# Patient Record
Sex: FEMALE | Race: WHITE | NOT HISPANIC OR LATINO
[De-identification: names, ages, dates, MRNs, and addresses within clinical notes are randomized per-mention and may not be internally consistent; named-entity substitution may affect disease eponyms.]

---

## 2017-01-12 ENCOUNTER — RECORD ABSTRACTING (OUTPATIENT)
Age: 22
End: 2017-01-12

## 2017-04-03 ENCOUNTER — CLINICAL ADVICE (OUTPATIENT)
Age: 22
End: 2017-04-03

## 2017-08-28 ENCOUNTER — CLINICAL ADVICE (OUTPATIENT)
Age: 22
End: 2017-08-28

## 2018-02-20 ENCOUNTER — TRANSCRIPTION ENCOUNTER (OUTPATIENT)
Age: 23
End: 2018-02-20

## 2018-10-09 ENCOUNTER — APPOINTMENT (OUTPATIENT)
Dept: ORTHOPEDIC SURGERY | Facility: CLINIC | Age: 23
End: 2018-10-09
Payer: COMMERCIAL

## 2018-10-09 VITALS
WEIGHT: 140 LBS | SYSTOLIC BLOOD PRESSURE: 105 MMHG | BODY MASS INDEX: 22.5 KG/M2 | HEIGHT: 66 IN | HEART RATE: 55 BPM | DIASTOLIC BLOOD PRESSURE: 72 MMHG

## 2018-10-09 DIAGNOSIS — M21.70 UNEQUAL LIMB LENGTH (ACQUIRED), UNSPECIFIED SITE: ICD-10-CM

## 2018-10-09 DIAGNOSIS — M53.3 SACROCOCCYGEAL DISORDERS, NOT ELSEWHERE CLASSIFIED: ICD-10-CM

## 2018-10-09 PROCEDURE — 99204 OFFICE O/P NEW MOD 45 MIN: CPT

## 2020-01-16 ENCOUNTER — TRANSCRIPTION ENCOUNTER (OUTPATIENT)
Age: 25
End: 2020-01-16

## 2020-07-08 ENCOUNTER — TRANSCRIPTION ENCOUNTER (OUTPATIENT)
Age: 25
End: 2020-07-08

## 2020-07-09 ENCOUNTER — TRANSCRIPTION ENCOUNTER (OUTPATIENT)
Age: 25
End: 2020-07-09

## 2020-07-14 ENCOUNTER — TRANSCRIPTION ENCOUNTER (OUTPATIENT)
Age: 25
End: 2020-07-14

## 2020-08-28 ENCOUNTER — TRANSCRIPTION ENCOUNTER (OUTPATIENT)
Age: 25
End: 2020-08-28

## 2020-09-03 ENCOUNTER — RESULT REVIEW (OUTPATIENT)
Age: 25
End: 2020-09-03

## 2020-11-02 ENCOUNTER — APPOINTMENT (OUTPATIENT)
Dept: INTERNAL MEDICINE | Facility: CLINIC | Age: 25
End: 2020-11-02
Payer: COMMERCIAL

## 2020-11-02 ENCOUNTER — TRANSCRIPTION ENCOUNTER (OUTPATIENT)
Age: 25
End: 2020-11-02

## 2020-11-02 VITALS
BODY MASS INDEX: 21.69 KG/M2 | WEIGHT: 135 LBS | RESPIRATION RATE: 18 BRPM | SYSTOLIC BLOOD PRESSURE: 96 MMHG | HEIGHT: 66 IN | TEMPERATURE: 98.2 F | DIASTOLIC BLOOD PRESSURE: 58 MMHG | HEART RATE: 66 BPM | OXYGEN SATURATION: 100 %

## 2020-11-02 DIAGNOSIS — Z00.00 ENCOUNTER FOR GENERAL ADULT MEDICAL EXAMINATION W/OUT ABNORMAL FINDINGS: ICD-10-CM

## 2020-11-02 DIAGNOSIS — F41.9 ANXIETY DISORDER, UNSPECIFIED: ICD-10-CM

## 2020-11-02 PROCEDURE — 99072 ADDL SUPL MATRL&STAF TM PHE: CPT

## 2020-11-02 PROCEDURE — G0444 DEPRESSION SCREEN ANNUAL: CPT | Mod: NC,59

## 2020-11-02 PROCEDURE — G0442 ANNUAL ALCOHOL SCREEN 15 MIN: CPT | Mod: NC

## 2020-11-02 PROCEDURE — 99385 PREV VISIT NEW AGE 18-39: CPT | Mod: 25

## 2020-11-02 RX ORDER — MINOCYCLINE HYDROCHLORIDE 65 MG/1
65 TABLET, FILM COATED, EXTENDED RELEASE ORAL
Refills: 0 | Status: ACTIVE | COMMUNITY

## 2020-11-02 RX ORDER — ESCITALOPRAM OXALATE 20 MG/1
20 TABLET ORAL
Refills: 0 | Status: DISCONTINUED | COMMUNITY
End: 2020-11-02

## 2020-11-02 RX ORDER — LEVONORGESTREL AND ETHINYL ESTRADIOL 0.15-0.03
KIT ORAL
Refills: 0 | Status: ACTIVE | COMMUNITY

## 2020-11-02 NOTE — HISTORY OF PRESENT ILLNESS
[FreeTextEntry1] : New patient  [de-identified] : Feels like room goes sideways when sleeping or lying down only at night\par -feels like she's sliding\par -doesn’t happen in daytime\par -3 mos hx\par \par never had before but had presyncope growing up \par \par Graduated college [employer] for [employer]\par

## 2020-11-02 NOTE — HEALTH RISK ASSESSMENT
[Very Good] : ~his/her~ current health as very good [Good] : ~his/her~  mood as  good [Yes] : Yes [2 - 4 times a month (2 pts)] : 2-4 times a month (2 points) [1 or 2 (0 pts)] : 1 or 2 (0 points) [Never (0 pts)] : Never (0 points) [No] : In the past 12 months have you used drugs other than those required for medical reasons? No [0] : 2) Feeling down, depressed, or hopeless: Not at all (0) [Patient reported PAP Smear was normal] : Patient reported PAP Smear was normal [HIV Test offered] : HIV Test offered [Hepatitis C test offered] : Hepatitis C test offered [None] : None [With Family] : lives with family [# of Members in Household ___] :  household currently consist of [unfilled] member(s) [College] : College [Single] : single [Sexually Active] : sexually active [Feels Safe at Home] : Feels safe at home [Fully functional (bathing, dressing, toileting, transferring, walking, feeding)] : Fully functional (bathing, dressing, toileting, transferring, walking, feeding) [Fully functional (using the telephone, shopping, preparing meals, housekeeping, doing laundry, using] : Fully functional and needs no help or supervision to perform IADLs (using the telephone, shopping, preparing meals, housekeeping, doing laundry, using transportation, managing medications and managing finances) [Smoke Detector] : smoke detector [Carbon Monoxide Detector] : carbon monoxide detector [FreeTextEntry1] : see above [] : No [de-identified] : regular runs an hour / day  [de-identified] : had anorrhexia but recovered since 23 years old [[id number]] : 0 [Change in mental status noted] : No change in mental status noted [Language] : denies difficulty with language [Behavior] : denies difficulty with behavior [Learning/Retaining New Information] : denies difficulty learning/retaining new information [Handling Complex Tasks] : denies difficulty handling complex tasks [Reasoning] : denies difficulty with reasoning [Spatial Ability and Orientation] : denies difficulty with spatial ability and orientation [High Risk Behavior] : no high risk behavior [Reports changes in hearing] : Reports no changes in hearing [Reports changes in vision] : Reports no changes in vision [Reports changes in dental health] : Reports no changes in dental health [PapSmearDate] : 8/2020 [PapSmearComments] : HPV  [de-identified] : mom and brothers [de-identified] : recent grad looking for work [FreeTextEntry2] : looking at buying / merchandising [de-identified] : some hearing loss stable since birth

## 2021-08-19 ENCOUNTER — RESULT REVIEW (OUTPATIENT)
Age: 26
End: 2021-08-19

## 2022-05-31 ENCOUNTER — RESULT REVIEW (OUTPATIENT)
Age: 27
End: 2022-05-31

## 2023-03-16 ENCOUNTER — EMERGENCY (EMERGENCY)
Facility: HOSPITAL | Age: 28
LOS: 1 days | Discharge: HOME | End: 2023-03-16
Attending: EMERGENCY MEDICINE | Admitting: EMERGENCY MEDICINE
Payer: COMMERCIAL

## 2023-03-16 VITALS
SYSTOLIC BLOOD PRESSURE: 96 MMHG | RESPIRATION RATE: 17 BRPM | TEMPERATURE: 98 F | WEIGHT: 134.92 LBS | HEART RATE: 95 BPM | OXYGEN SATURATION: 98 % | DIASTOLIC BLOOD PRESSURE: 61 MMHG

## 2023-03-16 VITALS
RESPIRATION RATE: 18 BRPM | DIASTOLIC BLOOD PRESSURE: 58 MMHG | TEMPERATURE: 101 F | SYSTOLIC BLOOD PRESSURE: 98 MMHG | HEART RATE: 96 BPM | OXYGEN SATURATION: 100 %

## 2023-03-16 LAB
ALBUMIN SERPL ELPH-MCNC: 3.9 G/DL — SIGNIFICANT CHANGE UP (ref 3.3–5)
ALP SERPL-CCNC: 42 U/L — SIGNIFICANT CHANGE UP (ref 40–120)
ALT FLD-CCNC: 36 U/L — SIGNIFICANT CHANGE UP (ref 10–45)
ANION GAP SERPL CALC-SCNC: 7 MMOL/L — SIGNIFICANT CHANGE UP (ref 5–17)
APPEARANCE UR: CLEAR — SIGNIFICANT CHANGE UP
AST SERPL-CCNC: 27 U/L — SIGNIFICANT CHANGE UP (ref 10–40)
BACTERIA # UR AUTO: ABNORMAL /HPF
BASOPHILS # BLD AUTO: 0.02 K/UL — SIGNIFICANT CHANGE UP (ref 0–0.2)
BASOPHILS NFR BLD AUTO: 0.3 % — SIGNIFICANT CHANGE UP (ref 0–2)
BILIRUB SERPL-MCNC: 0.3 MG/DL — SIGNIFICANT CHANGE UP (ref 0.2–1.2)
BILIRUB UR-MCNC: NEGATIVE — SIGNIFICANT CHANGE UP
BUN SERPL-MCNC: 9 MG/DL — SIGNIFICANT CHANGE UP (ref 7–23)
CALCIUM SERPL-MCNC: 8.3 MG/DL — LOW (ref 8.4–10.5)
CHLORIDE SERPL-SCNC: 103 MMOL/L — SIGNIFICANT CHANGE UP (ref 96–108)
CO2 SERPL-SCNC: 27 MMOL/L — SIGNIFICANT CHANGE UP (ref 22–31)
COLOR SPEC: YELLOW — SIGNIFICANT CHANGE UP
COMMENT - URINE: SIGNIFICANT CHANGE UP
CREAT SERPL-MCNC: 0.66 MG/DL — SIGNIFICANT CHANGE UP (ref 0.5–1.3)
DIFF PNL FLD: NEGATIVE — SIGNIFICANT CHANGE UP
EGFR: 124 ML/MIN/1.73M2 — SIGNIFICANT CHANGE UP
EOSINOPHIL # BLD AUTO: 0 K/UL — SIGNIFICANT CHANGE UP (ref 0–0.5)
EOSINOPHIL NFR BLD AUTO: 0 % — SIGNIFICANT CHANGE UP (ref 0–6)
EPI CELLS # UR: ABNORMAL
FLUAV AG NPH QL: SIGNIFICANT CHANGE UP
FLUBV AG NPH QL: SIGNIFICANT CHANGE UP
GLUCOSE SERPL-MCNC: 90 MG/DL — SIGNIFICANT CHANGE UP (ref 70–99)
GLUCOSE UR QL: NEGATIVE — SIGNIFICANT CHANGE UP
HCT VFR BLD CALC: 42 % — SIGNIFICANT CHANGE UP (ref 34.5–45)
HGB BLD-MCNC: 13.8 G/DL — SIGNIFICANT CHANGE UP (ref 11.5–15.5)
IMM GRANULOCYTES NFR BLD AUTO: 0.3 % — SIGNIFICANT CHANGE UP (ref 0–0.9)
KETONES UR-MCNC: ABNORMAL
LEUKOCYTE ESTERASE UR-ACNC: ABNORMAL
LIDOCAIN IGE QN: 99 U/L — SIGNIFICANT CHANGE UP (ref 73–393)
LYMPHOCYTES # BLD AUTO: 0.17 K/UL — LOW (ref 1–3.3)
LYMPHOCYTES # BLD AUTO: 2.2 % — LOW (ref 13–44)
MCHC RBC-ENTMCNC: 31.5 PG — SIGNIFICANT CHANGE UP (ref 27–34)
MCHC RBC-ENTMCNC: 32.9 GM/DL — SIGNIFICANT CHANGE UP (ref 32–36)
MCV RBC AUTO: 95.9 FL — SIGNIFICANT CHANGE UP (ref 80–100)
MONOCYTES # BLD AUTO: 0.31 K/UL — SIGNIFICANT CHANGE UP (ref 0–0.9)
MONOCYTES NFR BLD AUTO: 3.9 % — SIGNIFICANT CHANGE UP (ref 2–14)
NEUTROPHILS # BLD AUTO: 7.37 K/UL — SIGNIFICANT CHANGE UP (ref 1.8–7.4)
NEUTROPHILS NFR BLD AUTO: 93.3 % — HIGH (ref 43–77)
NITRITE UR-MCNC: NEGATIVE — SIGNIFICANT CHANGE UP
NRBC # BLD: 0 /100 WBCS — SIGNIFICANT CHANGE UP (ref 0–0)
PH UR: 9 — HIGH (ref 5–8)
PLATELET # BLD AUTO: 173 K/UL — SIGNIFICANT CHANGE UP (ref 150–400)
POTASSIUM SERPL-MCNC: 3.9 MMOL/L — SIGNIFICANT CHANGE UP (ref 3.5–5.3)
POTASSIUM SERPL-SCNC: 3.9 MMOL/L — SIGNIFICANT CHANGE UP (ref 3.5–5.3)
PROT SERPL-MCNC: 6.9 G/DL — SIGNIFICANT CHANGE UP (ref 6–8.3)
PROT UR-MCNC: 15
RBC # BLD: 4.38 M/UL — SIGNIFICANT CHANGE UP (ref 3.8–5.2)
RBC # FLD: 12.1 % — SIGNIFICANT CHANGE UP (ref 10.3–14.5)
RBC CASTS # UR COMP ASSIST: NEGATIVE /HPF — SIGNIFICANT CHANGE UP (ref 0–4)
RSV RNA NPH QL NAA+NON-PROBE: SIGNIFICANT CHANGE UP
SARS-COV-2 RNA SPEC QL NAA+PROBE: SIGNIFICANT CHANGE UP
SODIUM SERPL-SCNC: 137 MMOL/L — SIGNIFICANT CHANGE UP (ref 135–145)
SP GR SPEC: 1.01 — SIGNIFICANT CHANGE UP (ref 1.01–1.02)
UROBILINOGEN FLD QL: NEGATIVE — SIGNIFICANT CHANGE UP
WBC # BLD: 7.89 K/UL — SIGNIFICANT CHANGE UP (ref 3.8–10.5)
WBC # FLD AUTO: 7.89 K/UL — SIGNIFICANT CHANGE UP (ref 3.8–10.5)
WBC UR QL: SIGNIFICANT CHANGE UP /HPF (ref 0–5)

## 2023-03-16 PROCEDURE — 83690 ASSAY OF LIPASE: CPT

## 2023-03-16 PROCEDURE — 99284 EMERGENCY DEPT VISIT MOD MDM: CPT

## 2023-03-16 PROCEDURE — 81025 URINE PREGNANCY TEST: CPT

## 2023-03-16 PROCEDURE — 87086 URINE CULTURE/COLONY COUNT: CPT

## 2023-03-16 PROCEDURE — 81001 URINALYSIS AUTO W/SCOPE: CPT

## 2023-03-16 PROCEDURE — 96375 TX/PRO/DX INJ NEW DRUG ADDON: CPT

## 2023-03-16 PROCEDURE — 74176 CT ABD & PELVIS W/O CONTRAST: CPT | Mod: MA

## 2023-03-16 PROCEDURE — 87637 SARSCOV2&INF A&B&RSV AMP PRB: CPT

## 2023-03-16 PROCEDURE — 96365 THER/PROPH/DIAG IV INF INIT: CPT

## 2023-03-16 PROCEDURE — 80053 COMPREHEN METABOLIC PANEL: CPT

## 2023-03-16 PROCEDURE — 74176 CT ABD & PELVIS W/O CONTRAST: CPT | Mod: 26,MA

## 2023-03-16 PROCEDURE — 99284 EMERGENCY DEPT VISIT MOD MDM: CPT | Mod: 25

## 2023-03-16 PROCEDURE — 85025 COMPLETE CBC W/AUTO DIFF WBC: CPT

## 2023-03-16 PROCEDURE — 36415 COLL VENOUS BLD VENIPUNCTURE: CPT

## 2023-03-16 RX ORDER — SODIUM CHLORIDE 9 MG/ML
1000 INJECTION INTRAMUSCULAR; INTRAVENOUS; SUBCUTANEOUS ONCE
Refills: 0 | Status: COMPLETED | OUTPATIENT
Start: 2023-03-16 | End: 2023-03-16

## 2023-03-16 RX ORDER — METOCLOPRAMIDE HCL 10 MG
10 TABLET ORAL ONCE
Refills: 0 | Status: COMPLETED | OUTPATIENT
Start: 2023-03-16 | End: 2023-03-16

## 2023-03-16 RX ORDER — ONDANSETRON 8 MG/1
4 TABLET, FILM COATED ORAL ONCE
Refills: 0 | Status: COMPLETED | OUTPATIENT
Start: 2023-03-16 | End: 2023-03-16

## 2023-03-16 RX ORDER — ONDANSETRON 8 MG/1
1 TABLET, FILM COATED ORAL
Qty: 9 | Refills: 0
Start: 2023-03-16 | End: 2023-03-18

## 2023-03-16 RX ORDER — CEFTRIAXONE 500 MG/1
1000 INJECTION, POWDER, FOR SOLUTION INTRAMUSCULAR; INTRAVENOUS ONCE
Refills: 0 | Status: COMPLETED | OUTPATIENT
Start: 2023-03-16 | End: 2023-03-16

## 2023-03-16 RX ORDER — KETOROLAC TROMETHAMINE 30 MG/ML
15 SYRINGE (ML) INJECTION ONCE
Refills: 0 | Status: DISCONTINUED | OUTPATIENT
Start: 2023-03-16 | End: 2023-03-16

## 2023-03-16 RX ADMIN — CEFTRIAXONE 100 MILLIGRAM(S): 500 INJECTION, POWDER, FOR SOLUTION INTRAMUSCULAR; INTRAVENOUS at 11:17

## 2023-03-16 RX ADMIN — Medication 15 MILLIGRAM(S): at 14:35

## 2023-03-16 RX ADMIN — SODIUM CHLORIDE 1000 MILLILITER(S): 9 INJECTION INTRAMUSCULAR; INTRAVENOUS; SUBCUTANEOUS at 11:16

## 2023-03-16 RX ADMIN — Medication 10 MILLIGRAM(S): at 14:35

## 2023-03-16 RX ADMIN — CEFTRIAXONE 1000 MILLIGRAM(S): 500 INJECTION, POWDER, FOR SOLUTION INTRAMUSCULAR; INTRAVENOUS at 12:15

## 2023-03-16 RX ADMIN — ONDANSETRON 4 MILLIGRAM(S): 8 TABLET, FILM COATED ORAL at 11:16

## 2023-03-16 NOTE — ED PROVIDER NOTE - CLINICAL SUMMARY MEDICAL DECISION MAKING FREE TEXT BOX
28-year-old female with no reported past medical history presents to the ED from urgent care for bilateral back pain x1 month worsened 2 days ago, and vomiting since 4 AM this morning.  Patient reports she took a home test for UTI and it was positive.  She went to urgent care this morning was given ODT Zofran which helped with the vomiting and she was referred to the ED.  Patient reports it feels similar to UTIs she had 4 months ago.  She denies any fever chills, urinary symptoms, abdominal pain, diarrhea, respiratory symptoms or prior abdominal surgeries. PE as noted above. labs/UA reviewed. abd CT pending to r/o stone. reassess Sam: 28-year-old female with no reported past medical history presents to the ED from urgent care for bilateral back pain x1 month worsened 2 days ago, and vomiting since 4 AM this morning.  Patient reports she took a home test for UTI and it was positive.  She went to urgent care this morning was given ODT Zofran which helped with the vomiting and she was referred to the ED.  Patient reports it feels similar to UTIs she had 4 months ago.  She denies any fever chills, urinary symptoms, abdominal pain, diarrhea, respiratory symptoms or prior abdominal surgeries. PE as noted above. labs/UA reviewed. abd CT pending to r/o stone. reassess.  UA seems unremarkable.  pt has h/o scoliosis which may be source of her chronic back pain. Sam: 28-year-old female with no reported past medical history presents to the ED from urgent care for bilateral back pain x1 month worsened 2 days ago, and vomiting since 4 AM this morning.  Patient reports she took a home test for UTI and it was positive.  She went to urgent care this morning was given ODT Zofran which helped with the vomiting and she was referred to the ED.  Patient reports it feels similar to UTIs she had 4 months ago.  She denies any fever chills, urinary symptoms, abdominal pain, diarrhea, respiratory symptoms or prior abdominal surgeries. PE as noted above. labs/UA reviewed. abd CT pending to r/o stone. reassess.  UA seems unremarkable.  pt has h/o scoliosis which may be source of her chronic back pain.    240pm: Abd CT results reviewed- NAF,  +nonspecific fluid in the small bowel, pt likely has gastroenteritis.  She developed a fever in the ED, she was given toradol. She was informed that she will most likely have diarrhea within the next 24 hours.  Will DC with a prescription of Zofran and strict return for abdominal precautions.  Patient has been tolerating p.o. while in the ED

## 2023-03-16 NOTE — ED PROVIDER NOTE - PHYSICAL EXAMINATION
Gen: Well appearing in NAD.    ENT: oral mucosa dry  Head: atraumatic  Heart: s1/s2, RRR  Lung: CTA b/l, no wheezing/rhonchi or rales. No tachypnea or hypoxia   Abd: soft, NT/ND, no rebound or guarding. +mild b/l CVAT (left >right)  Msk: no pedal edema or calf pain,  Neuro: AAO x3  Skin: Normal for race.   Psych: Alert and oriented

## 2023-03-16 NOTE — ED PROVIDER NOTE - NSFOLLOWUPINSTRUCTIONS_ED_ALL_ED_FT
Follow up with your PMD within 48-72 hours.    Drink a lot of fluids.   Take the prescribed medication as needed for nausea.  Take over the counter tylenol or motrin for fevers  Eat bland, small meals as tolerated.   Return to the ED for worsening abdominal pain, especially in the right lower abdomen, persistent vomiting or fevers, or any concerns.  ***************

## 2023-03-16 NOTE — ED PROVIDER NOTE - OBJECTIVE STATEMENT
28-year-old female with no reported past medical history presents to the ED from urgent care for bilateral back pain x1 month worsened 2 days ago, and vomiting since 4 AM this morning.  Patient reports she took a home test for UTI and it was positive.  She went to urgent care this morning was given ODT Zofran which helped with the vomiting and she was referred to the ED.  Patient reports it feels similar to UTIs she had 4 months ago.  She denies any fever chills, urinary symptoms, abdominal pain, diarrhea, respiratory symptoms or prior abdominal surgeries.

## 2023-03-16 NOTE — ED ADULT NURSE NOTE - OBJECTIVE STATEMENT
Pt presents to ED with c/o multiple episodes of nonbloody emesis.  Seen at [facility] for r/o UTI.  Pt reports hx of UTI.  In ED, pt reports nausea and b/l flank pain.  Positive CVA tenderness.  Denies any further complaints.

## 2023-03-16 NOTE — ED PROVIDER NOTE - NS ED ATTENDING STATEMENT MOD
This was a shared visit with the LORRI. I reviewed and verified the documentation and independently performed the documented:

## 2023-03-16 NOTE — ED PROVIDER NOTE - PATIENT PORTAL LINK FT
You can access the FollowMyHealth Patient Portal offered by [facility] by registering at the following website: [email or web address]. By joining [facility]’s FollowMyHealth portal, you will also be able to view your health information using other applications (apps) compatible with our system.

## 2023-03-16 NOTE — ED ADULT TRIAGE NOTE - CHIEF COMPLAINT QUOTE
Patient sent from urgent care to r/o UTI. Patient complaints of nausea, vomiting and lower back pain started at 4AM.

## 2023-03-17 LAB
CULTURE RESULTS: SIGNIFICANT CHANGE UP
SPECIMEN SOURCE: SIGNIFICANT CHANGE UP

## 2023-10-08 ENCOUNTER — NON-APPOINTMENT (OUTPATIENT)
Age: 28
End: 2023-10-08